# Patient Record
Sex: FEMALE | Race: BLACK OR AFRICAN AMERICAN | NOT HISPANIC OR LATINO | Employment: FULL TIME | URBAN - METROPOLITAN AREA
[De-identification: names, ages, dates, MRNs, and addresses within clinical notes are randomized per-mention and may not be internally consistent; named-entity substitution may affect disease eponyms.]

---

## 2017-01-12 ENCOUNTER — HOSPITAL ENCOUNTER (OUTPATIENT)
Dept: RADIOLOGY | Facility: HOSPITAL | Age: 62
Discharge: HOME | End: 2017-01-12
Attending: ORTHOPAEDIC SURGERY
Payer: COMMERCIAL

## 2017-01-12 ENCOUNTER — OFFICE VISIT (OUTPATIENT)
Dept: ORTHOPEDICS | Facility: CLINIC | Age: 62
End: 2017-01-12
Payer: COMMERCIAL

## 2017-01-12 ENCOUNTER — OFFICE VISIT (OUTPATIENT)
Dept: INTERNAL MEDICINE | Facility: CLINIC | Age: 62
End: 2017-01-12
Payer: COMMERCIAL

## 2017-01-12 ENCOUNTER — OFFICE VISIT (OUTPATIENT)
Dept: OPHTHALMOLOGY | Facility: CLINIC | Age: 62
End: 2017-01-12
Payer: COMMERCIAL

## 2017-01-12 VITALS
BODY MASS INDEX: 38.9 KG/M2 | WEIGHT: 219.56 LBS | HEIGHT: 63 IN | RESPIRATION RATE: 12 BRPM | HEART RATE: 81 BPM | SYSTOLIC BLOOD PRESSURE: 174 MMHG | DIASTOLIC BLOOD PRESSURE: 82 MMHG

## 2017-01-12 DIAGNOSIS — H40.1192 MODERATE STAGE CHRONIC OPEN ANGLE GLAUCOMA: Primary | ICD-10-CM

## 2017-01-12 DIAGNOSIS — M25.561 ARTHRALGIA OF BOTH KNEES: ICD-10-CM

## 2017-01-12 DIAGNOSIS — G89.29 CHRONIC PAIN OF RIGHT KNEE: ICD-10-CM

## 2017-01-12 DIAGNOSIS — E11.9 DIABETES MELLITUS TYPE 2 WITHOUT RETINOPATHY: ICD-10-CM

## 2017-01-12 DIAGNOSIS — G89.29 CHRONIC PAIN OF LEFT KNEE: ICD-10-CM

## 2017-01-12 DIAGNOSIS — M25.562 BILATERAL CHRONIC KNEE PAIN: ICD-10-CM

## 2017-01-12 DIAGNOSIS — H53.8 BLURRY VISION: ICD-10-CM

## 2017-01-12 DIAGNOSIS — M25.561 BILATERAL CHRONIC KNEE PAIN: ICD-10-CM

## 2017-01-12 DIAGNOSIS — H25.013 CATARACT CORTICAL, SENILE, BILATERAL: ICD-10-CM

## 2017-01-12 DIAGNOSIS — M17.12 PRIMARY OSTEOARTHRITIS OF LEFT KNEE: Primary | ICD-10-CM

## 2017-01-12 DIAGNOSIS — I10 ESSENTIAL HYPERTENSION: ICD-10-CM

## 2017-01-12 DIAGNOSIS — M25.532 BILATERAL WRIST PAIN: Primary | ICD-10-CM

## 2017-01-12 DIAGNOSIS — M25.562 CHRONIC PAIN OF LEFT KNEE: ICD-10-CM

## 2017-01-12 DIAGNOSIS — M25.561 BILATERAL CHRONIC KNEE PAIN: Primary | ICD-10-CM

## 2017-01-12 DIAGNOSIS — M25.562 ARTHRALGIA OF BOTH KNEES: ICD-10-CM

## 2017-01-12 DIAGNOSIS — M25.561 CHRONIC PAIN OF RIGHT KNEE: ICD-10-CM

## 2017-01-12 DIAGNOSIS — E11.65 UNCONTROLLED TYPE 2 DIABETES MELLITUS WITH COMPLICATION, UNSPECIFIED LONG TERM INSULIN USE STATUS: ICD-10-CM

## 2017-01-12 DIAGNOSIS — M25.562 BILATERAL CHRONIC KNEE PAIN: Primary | ICD-10-CM

## 2017-01-12 DIAGNOSIS — G89.29 BILATERAL CHRONIC KNEE PAIN: Primary | ICD-10-CM

## 2017-01-12 DIAGNOSIS — E11.8 UNCONTROLLED TYPE 2 DIABETES MELLITUS WITH COMPLICATION, UNSPECIFIED LONG TERM INSULIN USE STATUS: ICD-10-CM

## 2017-01-12 DIAGNOSIS — M25.531 BILATERAL WRIST PAIN: Primary | ICD-10-CM

## 2017-01-12 DIAGNOSIS — F33.1 MODERATE EPISODE OF RECURRENT MAJOR DEPRESSIVE DISORDER: ICD-10-CM

## 2017-01-12 DIAGNOSIS — M17.11 PRIMARY OSTEOARTHRITIS OF RIGHT KNEE: ICD-10-CM

## 2017-01-12 DIAGNOSIS — D50.9 IRON DEFICIENCY ANEMIA, UNSPECIFIED IRON DEFICIENCY ANEMIA TYPE: ICD-10-CM

## 2017-01-12 DIAGNOSIS — E78.5 HYPERLIPIDEMIA, UNSPECIFIED HYPERLIPIDEMIA TYPE: ICD-10-CM

## 2017-01-12 DIAGNOSIS — G89.29 BILATERAL CHRONIC KNEE PAIN: ICD-10-CM

## 2017-01-12 PROCEDURE — 99204 OFFICE O/P NEW MOD 45 MIN: CPT | Mod: 25,S$GLB,, | Performed by: PHYSICIAN ASSISTANT

## 2017-01-12 PROCEDURE — 99999 PR PBB SHADOW E&M-EST. PATIENT-LVL III: CPT | Mod: PBBFAC,,, | Performed by: PHYSICIAN ASSISTANT

## 2017-01-12 PROCEDURE — 3079F DIAST BP 80-89 MM HG: CPT | Mod: S$GLB,,, | Performed by: PHYSICIAN ASSISTANT

## 2017-01-12 PROCEDURE — 3045F PR MOST RECENT HEMOGLOBIN A1C LEVEL 7.0-9.0%: CPT | Mod: S$GLB,,, | Performed by: PHYSICIAN ASSISTANT

## 2017-01-12 PROCEDURE — 92004 COMPRE OPH EXAM NEW PT 1/>: CPT | Mod: S$GLB,,, | Performed by: OPTOMETRIST

## 2017-01-12 PROCEDURE — 1159F MED LIST DOCD IN RCRD: CPT | Mod: S$GLB,,, | Performed by: FAMILY MEDICINE

## 2017-01-12 PROCEDURE — 4010F ACE/ARB THERAPY RXD/TAKEN: CPT | Mod: S$GLB,,, | Performed by: PHYSICIAN ASSISTANT

## 2017-01-12 PROCEDURE — 1159F MED LIST DOCD IN RCRD: CPT | Mod: S$GLB,,, | Performed by: PHYSICIAN ASSISTANT

## 2017-01-12 PROCEDURE — 3077F SYST BP >= 140 MM HG: CPT | Mod: S$GLB,,, | Performed by: PHYSICIAN ASSISTANT

## 2017-01-12 PROCEDURE — 4010F ACE/ARB THERAPY RXD/TAKEN: CPT | Mod: S$GLB,,, | Performed by: FAMILY MEDICINE

## 2017-01-12 PROCEDURE — 73562 X-RAY EXAM OF KNEE 3: CPT | Mod: 26,50,, | Performed by: RADIOLOGY

## 2017-01-12 PROCEDURE — 99999 PR PBB SHADOW E&M-EST. PATIENT-LVL II: CPT | Mod: PBBFAC,,, | Performed by: OPTOMETRIST

## 2017-01-12 PROCEDURE — 99214 OFFICE O/P EST MOD 30 MIN: CPT | Mod: S$GLB,,, | Performed by: FAMILY MEDICINE

## 2017-01-12 PROCEDURE — 3078F DIAST BP <80 MM HG: CPT | Mod: S$GLB,,, | Performed by: FAMILY MEDICINE

## 2017-01-12 PROCEDURE — 99999 PR PBB SHADOW E&M-EST. PATIENT-LVL IV: CPT | Mod: PBBFAC,,, | Performed by: FAMILY MEDICINE

## 2017-01-12 PROCEDURE — 3077F SYST BP >= 140 MM HG: CPT | Mod: S$GLB,,, | Performed by: OPTOMETRIST

## 2017-01-12 PROCEDURE — 20610 DRAIN/INJ JOINT/BURSA W/O US: CPT | Mod: 50,S$GLB,, | Performed by: PHYSICIAN ASSISTANT

## 2017-01-12 PROCEDURE — 3045F PR MOST RECENT HEMOGLOBIN A1C LEVEL 7.0-9.0%: CPT | Mod: S$GLB,,, | Performed by: FAMILY MEDICINE

## 2017-01-12 PROCEDURE — 3075F SYST BP GE 130 - 139MM HG: CPT | Mod: S$GLB,,, | Performed by: FAMILY MEDICINE

## 2017-01-12 PROCEDURE — 3078F DIAST BP <80 MM HG: CPT | Mod: S$GLB,,, | Performed by: OPTOMETRIST

## 2017-01-12 PROCEDURE — 2022F DILAT RTA XM EVC RTNOPTHY: CPT | Mod: S$GLB,,, | Performed by: FAMILY MEDICINE

## 2017-01-12 RX ORDER — GABAPENTIN 300 MG/1
300 CAPSULE ORAL 2 TIMES DAILY
Qty: 60 CAPSULE | Refills: 6 | Status: SHIPPED | OUTPATIENT
Start: 2017-01-12

## 2017-01-12 RX ORDER — METHYLPREDNISOLONE ACETATE 80 MG/ML
80 INJECTION, SUSPENSION INTRA-ARTICULAR; INTRALESIONAL; INTRAMUSCULAR; SOFT TISSUE ONCE
Status: COMPLETED | OUTPATIENT
Start: 2017-01-12 | End: 2017-01-12

## 2017-01-12 RX ORDER — LATANOPROST 50 UG/ML
1 SOLUTION/ DROPS OPHTHALMIC NIGHTLY
Qty: 1 BOTTLE | Refills: 3 | Status: SHIPPED | OUTPATIENT
Start: 2017-01-12 | End: 2018-01-12

## 2017-01-12 RX ORDER — NAPROXEN 375 MG/1
TABLET ORAL
Refills: 0 | COMMUNITY
Start: 2017-01-10 | End: 2017-01-12 | Stop reason: ALTCHOICE

## 2017-01-12 RX ORDER — METHOCARBAMOL 500 MG/1
500 TABLET, FILM COATED ORAL EVERY 6 HOURS PRN
Refills: 0 | COMMUNITY
Start: 2017-01-06 | End: 2017-01-12 | Stop reason: ALTCHOICE

## 2017-01-12 RX ORDER — CARVEDILOL 6.25 MG/1
6.25 TABLET ORAL DAILY
Refills: 5 | COMMUNITY
Start: 2016-10-21

## 2017-01-12 RX ORDER — CYCLOBENZAPRINE HCL 10 MG
10 TABLET ORAL NIGHTLY
Refills: 0 | COMMUNITY
Start: 2017-01-10

## 2017-01-12 RX ORDER — MELOXICAM 7.5 MG/1
7.5 TABLET ORAL 2 TIMES DAILY
Refills: 0 | COMMUNITY
Start: 2017-01-06 | End: 2017-01-12 | Stop reason: SDUPTHER

## 2017-01-12 RX ORDER — ATORVASTATIN CALCIUM 40 MG/1
40 TABLET, FILM COATED ORAL DAILY
Refills: 5 | COMMUNITY
Start: 2016-10-21

## 2017-01-12 RX ORDER — CITALOPRAM 20 MG/1
20 TABLET, FILM COATED ORAL DAILY
Qty: 30 TABLET | Refills: 11 | Status: SHIPPED | OUTPATIENT
Start: 2017-01-12 | End: 2018-01-12

## 2017-01-12 RX ORDER — MELOXICAM 7.5 MG/1
7.5 TABLET ORAL 2 TIMES DAILY
Qty: 60 TABLET | Refills: 6 | Status: SHIPPED | OUTPATIENT
Start: 2017-01-12

## 2017-01-12 RX ADMIN — METHYLPREDNISOLONE ACETATE 80 MG: 80 INJECTION, SUSPENSION INTRA-ARTICULAR; INTRALESIONAL; INTRAMUSCULAR; SOFT TISSUE at 10:01

## 2017-01-12 NOTE — LETTER
January 12, 2017      Kenton Salazar MD  [address] [facility] [place] [address]           [facility] - Ophthalmology  [address]  [place] [address]  Phone: [phone]  Fax: [phone]          Patient: Leah Mercado   MR Number: [id number]   YOB: 1955   Date of Visit: 1/12/2017       Dear Dr. Kenton Salazar:    Thank you for referring Leah Mercado to me for evaluation. Attached you will find relevant portions of my assessment and plan of care.    If you have questions, please do not hesitate to call me. I look forward to following Leah Mercado along with you.    Sincerely,    KVNG Chadwick, OD    Enclosure  CC:  No Recipients    If you would like to receive this communication electronically, please contact [email or web address] or [phone] to request more information on [employer] Link access.    For providers and/or their staff who would like to refer a patient to [facility], please contact us through our one-stop-shop provider referral line, [facility], at [phone].    If you feel you have received this communication in error or would no longer like to receive these types of communications, please e-mail [email or web address]

## 2017-01-12 NOTE — PROGRESS NOTES
HPI     New Patient  Diabetic/IDDM  Patient is being treated for glaucoma (Facundo Gold MD)  Medication: Latanoprost 0.005%  RE  Decreased distance and near point  Patient did not bring glasses to exam    MLC:  She desires to transfer her glaucoma care from [place] to me because   all of her other care is at [facility].  She says she has been compliant with   her COAG drops (latanoprost) but has not seen Dr. Facunod Gold M.D. For   over 6 months.  He IOP is relatively normal today but she will need full   baseline testing in the next 1-2 months.  I will refill her latanoprost Rx   with refills in the interim as she says she just refilled her last bottle.         Last edited by KVNG Chadwick, OD on 1/12/2017  5:06 PM.         Assessment /Plan     For exam results, see Encounter Report.    Moderate stage chronic open angle glaucoma  -     latanoprost 0.005 % ophthalmic solution; Place 1 drop into both eyes every evening.  Dispense: 1 Bottle; Refill: 3      MLC:  She desires to transfer her glaucoma care from [place] to me because all of her other care is at [facility].  She says she has been compliant with her COAG drops (latanoprost) but has not seen Dr. Facundo Gold M.D. For over 6 months.  He IOP is relatively normal today but she will need full baseline testing in the next 1-2 months.  I will refill her latanoprost Rx with refills in the interim as she says she just refilled her last bottle.     No diabetic retinopathy today in either eye.  I will follow over the next year.  She also has moderate NS/cortical cataracts which will need monitoring over the next year.  I will be able to follow the diabetes and cataracts over the next year in the course of monitoring her COAG.    Spec Rx updated.  OH OK OU otherwise.  RTC 3 months for IOP check with full testing in one year.

## 2017-01-12 NOTE — LETTER
January 12, 2017      Kenton Salazar MD  [address] [facility] [place] [address]           [facility] - Orthopedics  [address] [facility] [place] [address]  Phone: [phone]  Fax: [phone]          Patient: Leah Mercado   MR Number: [id number]   YOB: 1955   Date of Visit: 1/12/2017       Dear Dr. Kenton Salazar:    Thank you for referring Leah Mercado to me for evaluation. Attached you will find relevant portions of my assessment and plan of care.    If you have questions, please do not hesitate to call me. I look forward to following Leah Mercado along with you.    Sincerely,    Katie Turk PA-C    Enclosure  CC:  No Recipients    If you would like to receive this communication electronically, please contact [email or web address] or [phone] to request more information on [email or web address] Link access.    For providers and/or their staff who would like to refer a patient to [facility], please contact us through our one-stop-shop provider referral line, [facility], at [phone].    If you feel you have received this communication in error or would no longer like to receive these types of communications, please e-mail [email or web address]

## 2017-01-12 NOTE — MR AVS SNAPSHOT
[facility] Internal Medicine  [address] [facility] [place] [address]  Phone: [phone]  Fax: [phone]                  Leah Mercado   2017 8:40 AM   Office Visit    Description:  Female : 1955   Provider:  Kenton Salazar MD   Department:  [facility] - Internal Medicine           Reason for Visit     Generalized Body Aches     Blurred Vision           Diagnoses this Visit        Comments    Uncontrolled type 2 diabetes mellitus with complication, with long-term current use of insulin    -  Primary     Hyperlipidemia, unspecified hyperlipidemia type         Iron deficiency anemia, unspecified iron deficiency anemia type         Arthralgia of both knees         Essential hypertension         Blurry vision                To Do List           Future Appointments        Provider Department Dept Phone    2017 9:45 AM Katie Turk PA-C [facility] Orthopedics [phone]    2017 11:00 AM KVNG Chadwick OD [facility] Ophthalmology [phone]    2017 12:15 PM LABORATORY, [facility] [phone]    2017 11:00 AM Shaunna Steinberg RD, CDE [facility] Diabetes Management [phone]      Goals (5 Years of Data)     None       These Medications        Disp Refills Start End    citalopram (CELEXA) 20 MG tablet 30 tablet 11 2017    Take 1 tablet (20 mg total) by mouth once daily. - Oral    Pharmacy: [facility] Ph #: [phone]       gabapentin (NEURONTIN) 300 MG capsule 60 capsule 6 2017     Take 1 capsule (300 mg total) by mouth 2 (two) times daily. - Oral    Pharmacy: [facility] [address] - [place] - [address] Ph #: [phone]       meloxicam (MOBIC) 7.5 MG tablet 60 tablet 6 2017     Take 1 tablet (7.5 mg total) by mouth 2 (two) times daily. - Oral    Pharmacy: [facility] - [place], [place] - [address] Ph #: [phone]          [facility]     [facility] Nurse Care Line - 24/7 Assistance  Registered nurses in the [facility] provide clinical advisement, health education, appointment booking, and other advisory services.  Call for this free service at [phone].             Medications           Message regarding Medications     Verify the changes and/or additions to your medication regime listed below are the same as discussed with your clinician today.  If any of these changes or additions are incorrect, please notify your healthcare provider.        START taking these NEW medications        Refills    citalopram (CELEXA) 20 MG tablet 11    Sig: Take 1 tablet (20 mg total) by mouth once daily.    Class: Normal    Route: Oral      CHANGE how you are taking these medications     Start Taking Instead of    gabapentin (NEURONTIN) 300 MG capsule gabapentin (NEURONTIN) 300 MG capsule    Dosage:  Take 1 capsule (300 mg total) by mouth 2 (two) times daily. Dosage:  take 1 capsule by mouth twice a day    Reason for Change:  Reorder     meloxicam (MOBIC) 7.5 MG tablet meloxicam (MOBIC) 7.5 MG tablet    Dosage:  Take 1 tablet (7.5 mg total) by mouth 2 (two) times daily. Dosage:  Take 7.5 mg by mouth 2 (two) times daily.    Reason for Change:  Reorder       STOP taking these medications     methocarbamol (ROBAXIN) 500 MG Tab Take 500 mg by mouth every 6 (six) hours as needed.    methocarbamol (ROBAXIN) 750 MG Tab take 1 tablet by mouth three times a day    naproxen (NAPROSYN) 375 MG tablet take 1 tablet by mouth twice a day with meals for 7 days           Verify that the below list of medications is an accurate representation of the medications you are currently taking.  If none reported, the list may be blank. If incorrect, please contact your healthcare provider. Carry this list with you in case of emergency.           Current Medications     amlodipine (NORVASC) 10 MG tablet take 1 tablet by mouth once daily     "atorvastatin (LIPITOR) 40 MG tablet Take 40 mg by mouth once daily.     calcium carbonate 1250 MG capsule Take 1,250 mg by mouth 2 (two) times daily with meals.    carvedilol (COREG) 6.25 MG tablet Take 6.25 mg by mouth once daily.     cloNIDine (CATAPRES) 0.2 MG tablet take 1 tablet by mouth twice a day    cyclobenzaprine (FLEXERIL) 10 MG tablet Take 10 mg by mouth nightly.    gabapentin (NEURONTIN) 300 MG capsule Take 1 capsule (300 mg total) by mouth 2 (two) times daily.    glimepiride (AMARYL) 4 MG tablet take 1 tablet once daily WITH BREAKFAST    hydrochlorothiazide (HYDRODIURIL) 25 MG tablet Take 1 tablet (25 mg total) by mouth once daily.    LANTUS 100 unit/mL injection inject 45 units UNDER THE SKIN EVERY MORNING AND INJECT 45 UNITS EVERY EVENING    latanoprost 0.005 % ophthalmic solution     lisinopril (PRINIVIL,ZESTRIL) 20 MG tablet take 1 tablet by mouth once daily    meloxicam (MOBIC) 7.5 MG tablet Take 1 tablet (7.5 mg total) by mouth 2 (two) times daily.    metformin (GLUCOPHAGE) 1000 MG tablet Take 1 tablet (1,000 mg total) by mouth 2 (two) times daily with meals.    pravastatin (PRAVACHOL) 40 MG tablet take 1 tablet by mouth once daily    VITAMIN D2 50,000 unit capsule Take 1 capsule by mouth Once a week.    citalopram (CELEXA) 20 MG tablet Take 1 tablet (20 mg total) by mouth once daily.           Clinical Reference Information           Vital Signs - Last Recorded  Most recent update: 1/12/2017  8:50 AM by Nu Dover LPN    BP Pulse Temp Ht Wt BMI    (!) 158/90 (BP Location: Right arm, Patient Position: Sitting, BP Method: Manual) 88 98 °F (36.7 °C) (Tympanic) 5' 3" (1.6 m) 99.6 kg (219 lb 9.3 oz) 38.9 kg/m2      Blood Pressure          Most Recent Value    BP  (!)  158/90      Allergies as of 1/12/2017     No Known Allergies      Immunizations Administered on Date of Encounter - 1/12/2017     None      Orders Placed During Today's Visit      Normal Orders This Visit    Ambulatory Referral " to Diabetes Education     Ambulatory referral to Optometry     Ambulatory referral to Orthopedics     Future Labs/Procedures Expected by Expires    CBC auto differential  1/12/2017 3/13/2018    Comprehensive metabolic panel  1/12/2017 3/13/2018    Ferritin  1/12/2017 3/13/2018    Hemoglobin A1c  1/12/2017 3/13/2018    Iron and TIBC  1/12/2017 3/13/2018    Lipid panel  1/12/2017 1/12/2018      [facility] Sign-Up     Activating your [facility] account is as easy as 1-2-3!     1) Visit [email or web address], select Sign Up Now, enter this activation code and your date of birth, then select Next.  [email or web address]  Expires: 2/26/2017  9:22 AM      2) Create a username and password to use when you visit [facility] in the future and select a security question in case you lose your password and select Next.    3) Enter your e-mail address and click Sign Up!    Additional Information  If you have questions, please e-mail [email or web address] or call [phone] to talk to our [facility] staff. Remember, [facility] is NOT to be used for urgent needs. For medical emergencies, dial 911.

## 2017-01-12 NOTE — PROGRESS NOTES
Subjective:      Patient ID: Leah Mercado is a 61 y.o. female.    Chief Complaint: Pain of the Right Knee and Pain of the Left Knee      HPI: Leah Mercado  is a 61 y.o. female who c/o Pain of the Right Knee and Pain of the Left Knee   for duration of several years.  She denies an inciting injury.  Her pain level is 10 out of 10 in severity.  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  SHe is unable to do full shift secondary to her pain.  She has tried over-the-counter Tylenol as well as over-the-counter anti-inflammatories in the past without relief.  She has seen Dr. Timbo Tomlinson previously.  He has given her injections in the past which only alleviated her symptoms for approximately 3 months.  She has not seen him in years per her report.  Quality is aching, sharp, burning, and intermittent.  Alleviating factors include ice as well as steroid injections.  Aggravating factors include prolonged weightbearing as well as prolonged inactivity.  She describes start up pain.  She complains of associated swelling on occasion as well.  The left knee is worse than the right knee.    Review of Systems   Constitution: Negative for fever.   HENT: Negative for headaches.    Cardiovascular: Negative for chest pain.   Respiratory: Negative for cough and shortness of breath.    Skin: Negative for rash.   Musculoskeletal: Positive for joint pain, joint swelling and stiffness.   Gastrointestinal: Negative for heartburn.   Neurological: Negative for numbness.         Objective:        General    Nursing note and vitals reviewed.  Constitutional: She is oriented to person, place, and time. She appears well-developed and well-nourished.   HENT:   Head: Normocephalic and atraumatic.   Eyes: EOM are normal.   Cardiovascular: Normal rate and regular rhythm.    Pulmonary/Chest: Effort normal.   Abdominal: Soft.   Neurological: She is alert and oriented to person, place, and time.   Psychiatric: She has a normal mood  and affect. Her behavior is normal.     General Musculoskeletal Exam   Gait: normal       Right Knee Exam   Right knee exam is normal.    Inspection   Erythema: absent  Swelling: absent  Effusion: effusion  Deformity: deformity  Bruising: absent    Tenderness   The patient is tender to palpation of the condyle and medial joint line.    Crepitus   The patient has crepitus of the patella.    Range of Motion   Extension: normal Right knee extension grade: Pain with full extension.   Flexion: normal     Tests   Meniscus   Jocelynn:  Medial - negative Lateral - negative  Ligament Examination Lachman: normal (-1 to 2mm) PCL-Posterior Drawer: normal (0 to 2mm)     MCL - Valgus: normal (0 to 2mm)  LCL - Varus: normal  Patella   Patellar Apprehension: negative  Patellar Tracking: normal  Patellar Grind: positive    Other   Meniscal Cyst: absent  Popliteal (Baker's) Cyst: absent  Sensation: normal    Comments:  Comp soft, cap refill < 2 sec.    Left Knee Exam     Inspection   Erythema: absent  Swelling: absent  Effusion: absent  Deformity: deformity  Bruising: absent    Tenderness   The patient tender to palpation of the condyle and medial joint line.    Crepitus   The patient has crepitus of the patella.    Range of Motion   Extension: normal Left knee extension grade: pain full extension.   Flexion: normal     Tests   Meniscus   Jocelynn:  Medial - negative Lateral - negative  Stability Lachman: normal (-1 to 2mm) PCL-Posterior Drawer: normal (0 to 2mm)  MCL - Valgus: normal (0 to 2mm)  LCL - Varus: normal (0 to 2mm)  Patella   Patellar Apprehension: negative  Patellar Tracking: normal  Patellar Grind: positive    Other   Meniscal Cyst: absent  Popliteal (Baker's) Cyst: absent  Sensation: normal    Comments:  Comp soft, cap refill < 2 sec.    Muscle Strength   Right Lower Extremity   Quadriceps:  5/5   Hamstrin/5   Left Lower Extremity   Quadriceps:  4/5   Hamstrin/5     Vascular Exam       Edema  Right Lower Leg:  absent  Left Lower Leg: absent            Xray:   Bilateral knees from today images and report were reviewed today.  I agree with the radiologist's interpretation.  Compared to December 14, 2011. Mild progression of tricompartment degenerative change in the medial tibiofemoral joint spaces bilaterally. No joint effusion or acute osseous findings.    Labs  Hgb A1c - 8.2 in 4/2016 which indicates poorly controlled DM.      Assessment:       Encounter Diagnoses   Name Primary?    Primary osteoarthritis of left knee Yes    Primary osteoarthritis of right knee     Chronic pain of right knee     Chronic pain of left knee     Uncontrolled type 2 diabetes mellitus with complication, unspecified long term insulin use status           Plan:       Leah was seen today for pain and pain.    Diagnoses and all orders for this visit:    Primary osteoarthritis of left knee  -     methylPREDNISolone acetate injection 80 mg; Inject 1 mL (80 mg total) into the articular space once.  -     hylan g-f 20 48 mg/6 mL injection 48 mg; Inject 48 mg into the articular space one time.  -     Prior Authorization Order    Primary osteoarthritis of right knee  -     methylPREDNISolone acetate injection 80 mg; Inject 1 mL (80 mg total) into the articular space once.  -     hylan g-f 20 48 mg/6 mL injection 48 mg; Inject 48 mg into the articular space one time.  -     Prior Authorization Order    Chronic pain of right knee  -     methylPREDNISolone acetate injection 80 mg; Inject 1 mL (80 mg total) into the articular space once.  -     hylan g-f 20 48 mg/6 mL injection 48 mg; Inject 48 mg into the articular space one time.  -     Prior Authorization Order    Chronic pain of left knee  -     methylPREDNISolone acetate injection 80 mg; Inject 1 mL (80 mg total) into the articular space once.  -     hylan g-f 20 48 mg/6 mL injection 48 mg; Inject 48 mg into the articular space one time.  -     Prior Authorization Order    Uncontrolled type 2  diabetes mellitus with complication, unspecified long term insulin use status    Ms. Lynn comes in today to be evaluated for the bilateral knees.  These are new problems to me.  She has significant osteoarthritic changes in the bilateral knees.  We discussed conservative treatment options for osteoarthritis of the knees including; steroid injections, hyaluronic acid injections, braces, physical therapy for strengthening, activity modification, as well as anti-inflammatory medication.  I do recommend getting her an neoprene hinged knee brace for the left knee.  I would also like to get her some physical therapy exercises for the bilateral knees.  She has some weakness on the left side and I think she would benefit from getting her muscles stronger.  She works at  [employer].  She would like to talk to the physical therapist about some exercises rather than doing formal therapy.  Dr. Salazar put her on meloxicam prescription this morning.  She is on 7.5 mg twice a day.  I recommend she start taking those today.  She should not take any other oral anti-inflammatories over-the-counter while on this.  We also discussed risks and benefits of a steroid injection.  She is diabetic.  Last A1c showed that it was uncontrolled.  But this was back in April.  Steroid injections may cause her blood sugars to go up, and can affect the blood work by Dr. Salazar has ordered for her today.  She understands this.  If they go up and she has trouble getting them under control or do not return to baseline, recommend she follow up with Dr. Salazar.  I will also submit an order for hyaluronic acid injection.  We discussed risks and benefits of proceeding with Synvisc 1 injection in the bilateral knees.  She would like me to go ahead and get that set up for her as well.  I will plan to see her back in 1 month for the Synvisc 1 injections in bilateral knees.  She has problems before then, she should notify the office.   Patient verbalizes understanding and is in agreement with the above.    Return in about 4 weeks (around 2/9/2017) for for synvisc one bilateral knees.      The patient understands, chooses and consents to this plan and accepts all   the risks which include but are not limited to the risks mentioned above.     Left Knee Injection Report:  After verbal consent was obtained for left knee injection, patient ID, site, and side were verified.  The  left  knee was sterilly prepped in the standard fashion.  A 22-gauge needle was introduced into left knee joint from an sina-lateral site without complication. The left knee was then injected with 20 mg lidocaine plain and 80 mg depomedrol.  A sterile bandaid was applied.  The patient was informed to apply an ice pack approximately 10min once arriving home and not to do anything strenuous for 24hours.  She was instructed to call if there were any problems. The patient was discharged in stable condition.    Right Knee Injection Report:  After verbal consent was obtained for right knee injection, patient ID, site, and side were verified.  The  right  knee was sterilly prepped in the standard fashion.  A 22-gauge needle was introduced into right knee joint from an sina-lateral site without complication. The right knee was then injected with 20 mg lidocaine plain and 80 mg depomedrol.  A sterile bandaid was applied.  The patient was informed to apply an ice pack approximately 10min once arriving home and not to do anything strenuous for 24hours.  She was instructed to call if there were any problems. The patient was discharged in stable condition.    Disclaimer: This note was prepared using a voice recognition system and is likely to have sound alike errors within the text.

## 2017-01-12 NOTE — MR AVS SNAPSHOT
[facility]  [address]  [place] [address]  Phone: [phone]  Fax: [phone]                  Leah Mercado   2017 11:00 AM   Office Visit    Description:  Female : 1955   Provider:  KVNG Chadwick, OD   Department:  [facility]           Reason for Visit     Diabetic Eye Exam     Glaucoma           Diagnoses this Visit        Comments    Moderate stage chronic open angle glaucoma    -  Primary     Diabetes mellitus type 2 without retinopathy         Cataract cortical, senile, bilateral         Cataract, nuclear, bilateral                To Do List           Future Appointments        Provider Department Dept Phone    2017 8:00 AM [facility] XR2 [facility] [phone]    2017 8:15 AM Katie Turk PA-C [facility] [phone]    2017 7:30 AM FIELDS, VISUAL-ONE [facility] [phone]    2017 8:00 AM KVNG Chadwick, FLOR [facility] [phone]    2017 8:00 AM Katie Turk PA-C [facility] [phone]      Goals (5 Years of Data)     None      Follow-Up and Disposition     Return in about 3 months (around 2017).       These Medications        Disp Refills Start End    latanoprost 0.005 % ophthalmic solution 1 Bottle 3 2017    Place 1 drop into both eyes every evening. - Both Eyes    Pharmacy: [id number]  [place]  #: [phone]         [facility]     [facility] -  Assistance  Registered nurses in the [facility] provide clinical advisement, health education, appointment booking, and other advisory services.  Call for this free service at [phone].             Medications           Message regarding Medications     Verify the changes and/or additions to your medication regime listed below are the same as discussed with your clinician today.  If any of  these changes or additions are incorrect, please notify your healthcare provider.        CHANGE how you are taking these medications     Start Taking Instead of    latanoprost 0.005 % ophthalmic solution latanoprost 0.005 % ophthalmic solution    Dosage:  Place 1 drop into both eyes every evening.     Reason for Change:  Reorder            Verify that the below list of medications is an accurate representation of the medications you are currently taking.  If none reported, the list may be blank. If incorrect, please contact your healthcare provider. Carry this list with you in case of emergency.           Current Medications     amlodipine (NORVASC) 10 MG tablet take 1 tablet by mouth once daily    atorvastatin (LIPITOR) 40 MG tablet Take 40 mg by mouth once daily.     calcium carbonate 1250 MG capsule Take 1,250 mg by mouth 2 (two) times daily with meals.    carvedilol (COREG) 6.25 MG tablet Take 6.25 mg by mouth once daily.     cloNIDine (CATAPRES) 0.2 MG tablet take 1 tablet by mouth twice a day    cyclobenzaprine (FLEXERIL) 10 MG tablet Take 10 mg by mouth nightly.    gabapentin (NEURONTIN) 300 MG capsule Take 1 capsule (300 mg total) by mouth 2 (two) times daily.    glimepiride (AMARYL) 4 MG tablet take 1 tablet once daily WITH BREAKFAST    hydrochlorothiazide (HYDRODIURIL) 25 MG tablet Take 1 tablet (25 mg total) by mouth once daily.    LANTUS 100 unit/mL injection inject 45 units UNDER THE SKIN EVERY MORNING AND INJECT 45 UNITS EVERY EVENING    lisinopril (PRINIVIL,ZESTRIL) 20 MG tablet take 1 tablet by mouth once daily    meloxicam (MOBIC) 7.5 MG tablet Take 1 tablet (7.5 mg total) by mouth 2 (two) times daily.    metformin (GLUCOPHAGE) 1000 MG tablet Take 1 tablet (1,000 mg total) by mouth 2 (two) times daily with meals.    pravastatin (PRAVACHOL) 40 MG tablet take 1 tablet by mouth once daily    VITAMIN D2 50,000 unit capsule Take 1 capsule by mouth Once a week.    citalopram (CELEXA) 20 MG tablet Take 1  tablet (20 mg total) by mouth once daily.    latanoprost 0.005 % ophthalmic solution Place 1 drop into both eyes every evening.           Clinical Reference Information           Allergies as of 1/12/2017     No Known Allergies      Immunizations Administered on Date of Encounter - 1/12/2017     None      [facility] Sign-Up     Activating your [facility] account is as easy as 1-2-3!     1) Visit [email or web address], select Sign Up Now, enter this activation code and your date of birth, then select Next.  [email or web address]  Expires: 2/26/2017  9:22 AM      2) Create a username and password to use when you visit [facility] in the future and select a security question in case you lose your password and select Next.    3) Enter your e-mail address and click Sign Up!    Additional Information  If you have questions, please e-mail [email or web address] or call [phone] to talk to our [facility] staff. Remember, [facility] is NOT to be used for urgent needs. For medical emergencies, dial 911.

## 2017-01-12 NOTE — PROGRESS NOTES
Subjective:       Patient ID: Leah Mercado is a 61 y.o. female.    Chief Complaint: Generalized Body Aches and Blurred Vision    HPI Comments: General aches:      Pt has multiple issues with medical problems and DM, Hyperlipidemia, HTN and anemia. Pt has arthritis particularly in her knees per xray in 2016. Lengthy discussion with pt that many of her problems are interconnected stemming from uncontrolled DM, elevated Blood pressure, fatigue. Pt also reports sx consistent with depression to include longer 3 weeks with energy loss, anhedonia, poor concentration and sleep problems    Review of Systems   Constitutional: Negative.    Respiratory: Negative.    Cardiovascular: Negative.    Genitourinary: Negative.    Neurological: Negative.        Objective:      Physical Exam   Constitutional: She is oriented to person, place, and time. She appears well-developed and well-nourished.   Cardiovascular: Normal rate and regular rhythm.    Pulmonary/Chest: Effort normal and breath sounds normal.   Musculoskeletal:        Left knee: She exhibits decreased range of motion and erythema.   Neurological: She is alert and oriented to person, place, and time.   Skin: Skin is warm.       Assessment:       1. Uncontrolled type 2 diabetes mellitus with complication, with long-term current use of insulin    2. Hyperlipidemia, unspecified hyperlipidemia type    3. Iron deficiency anemia, unspecified iron deficiency anemia type    4. Arthralgia of both knees    5. Essential hypertension    6. Blurry vision    7. Uncontrolled type 2 diabetes with neuropathy    8. Moderate episode of recurrent major depressive disorder        Plan:       Uncontrolled type 2 diabetes mellitus with complication, with long-term current use of insulin  -     Ambulatory Referral to Diabetes Education  -     Hemoglobin A1c; Future; Expected date: 1/12/17    Hyperlipidemia, unspecified hyperlipidemia type  -     Lipid panel; Future; Expected date: 1/12/17    Iron  deficiency anemia, unspecified iron deficiency anemia type  -     Ferritin; Future; Expected date: 1/12/17  -     Iron and TIBC; Future; Expected date: 1/12/17    Arthralgia of both knees  -     Ambulatory referral to Orthopedics    Essential hypertension  -     CBC auto differential; Future; Expected date: 1/12/17  -     Comprehensive metabolic panel; Future; Expected date: 1/12/17    Blurry vision  -     Ambulatory referral to Optometry    Uncontrolled type 2 diabetes with neuropathy  Comments:  Will continue with the gabapentin    Moderate episode of recurrent major depressive disorder  Comments:  Will start on Celexa 20 mg    Other orders  -     citalopram (CELEXA) 20 MG tablet; Take 1 tablet (20 mg total) by mouth once daily.  Dispense: 30 tablet; Refill: 11  -     gabapentin (NEURONTIN) 300 MG capsule; Take 1 capsule (300 mg total) by mouth 2 (two) times daily.  Dispense: 60 capsule; Refill: 6  -     meloxicam (MOBIC) 7.5 MG tablet; Take 1 tablet (7.5 mg total) by mouth 2 (two) times daily.  Dispense: 60 tablet; Refill: 6

## 2017-01-13 ENCOUNTER — TELEPHONE (OUTPATIENT)
Dept: INTERNAL MEDICINE | Facility: CLINIC | Age: 62
End: 2017-01-13

## 2017-01-13 NOTE — TELEPHONE ENCOUNTER
----- Message from Noemi Jha sent at 1/13/2017 10:45 AM CST -----  Contact: [phone]  Pt states she is still in a lot of pain and wants to know what she can do for the pain. Pt can be reached at [phone] ([email or web address])

## 2017-01-13 NOTE — TELEPHONE ENCOUNTER
Patient called stating that she is in a lot of pain. Stating that the pain is all over. 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 She stated that she is taking all medications that was prescribed but nothing helping yet. She stated that her job is threatening to terminate her. Patient is asking if you would be able to fill out FMLA paperwork for her to take sometime off work due to her pain.

## 2017-01-15 VITALS
HEIGHT: 63 IN | WEIGHT: 219.56 LBS | TEMPERATURE: 98 F | DIASTOLIC BLOOD PRESSURE: 90 MMHG | BODY MASS INDEX: 38.9 KG/M2 | HEART RATE: 88 BPM | SYSTOLIC BLOOD PRESSURE: 138 MMHG

## 2017-01-15 DIAGNOSIS — M50.30 DDD (DEGENERATIVE DISC DISEASE), CERVICAL: Primary | ICD-10-CM

## 2017-01-15 DIAGNOSIS — M51.36 DDD (DEGENERATIVE DISC DISEASE), LUMBAR: ICD-10-CM

## 2017-01-17 ENCOUNTER — HOSPITAL ENCOUNTER (OUTPATIENT)
Dept: RADIOLOGY | Facility: HOSPITAL | Age: 62
Discharge: HOME | End: 2017-01-17
Attending: ORTHOPAEDIC SURGERY
Payer: COMMERCIAL

## 2017-01-17 ENCOUNTER — OFFICE VISIT (OUTPATIENT)
Dept: ORTHOPEDICS | Facility: CLINIC | Age: 62
End: 2017-01-17
Payer: COMMERCIAL

## 2017-01-17 VITALS
DIASTOLIC BLOOD PRESSURE: 94 MMHG | HEART RATE: 71 BPM | SYSTOLIC BLOOD PRESSURE: 163 MMHG | HEIGHT: 63 IN | WEIGHT: 217.81 LBS | BODY MASS INDEX: 38.59 KG/M2

## 2017-01-17 DIAGNOSIS — M75.21 BICEPS TENDINITIS, RIGHT: ICD-10-CM

## 2017-01-17 DIAGNOSIS — M25.532 BILATERAL WRIST PAIN: ICD-10-CM

## 2017-01-17 DIAGNOSIS — M50.30 DDD (DEGENERATIVE DISC DISEASE), CERVICAL: ICD-10-CM

## 2017-01-17 DIAGNOSIS — M19.011 DJD OF AC (ACROMIOCLAVICULAR) JOINTS, BILATERAL: ICD-10-CM

## 2017-01-17 DIAGNOSIS — M25.511 BILATERAL SHOULDER PAIN, UNSPECIFIED CHRONICITY: Primary | ICD-10-CM

## 2017-01-17 DIAGNOSIS — M25.512 ACUTE PAIN OF BOTH SHOULDERS: Primary | ICD-10-CM

## 2017-01-17 DIAGNOSIS — M25.531 BILATERAL WRIST PAIN: ICD-10-CM

## 2017-01-17 DIAGNOSIS — M17.12 PRIMARY OSTEOARTHRITIS OF LEFT KNEE: ICD-10-CM

## 2017-01-17 DIAGNOSIS — M25.511 BILATERAL SHOULDER PAIN, UNSPECIFIED CHRONICITY: ICD-10-CM

## 2017-01-17 DIAGNOSIS — M19.012 DJD OF AC (ACROMIOCLAVICULAR) JOINTS, BILATERAL: ICD-10-CM

## 2017-01-17 DIAGNOSIS — M25.512 BILATERAL SHOULDER PAIN, UNSPECIFIED CHRONICITY: Primary | ICD-10-CM

## 2017-01-17 DIAGNOSIS — M25.511 ACUTE PAIN OF BOTH SHOULDERS: Primary | ICD-10-CM

## 2017-01-17 DIAGNOSIS — M17.11 PRIMARY OSTEOARTHRITIS OF RIGHT KNEE: ICD-10-CM

## 2017-01-17 DIAGNOSIS — M25.512 BILATERAL SHOULDER PAIN, UNSPECIFIED CHRONICITY: ICD-10-CM

## 2017-01-17 DIAGNOSIS — M75.82 ROTATOR CUFF TENDINITIS, LEFT: ICD-10-CM

## 2017-01-17 PROCEDURE — 4010F ACE/ARB THERAPY RXD/TAKEN: CPT | Mod: S$GLB,,, | Performed by: PHYSICIAN ASSISTANT

## 2017-01-17 PROCEDURE — 73030 X-RAY EXAM OF SHOULDER: CPT | Mod: 26,50,, | Performed by: RADIOLOGY

## 2017-01-17 PROCEDURE — 99999 PR PBB SHADOW E&M-EST. PATIENT-LVL V: CPT | Mod: PBBFAC,,, | Performed by: PHYSICIAN ASSISTANT

## 2017-01-17 PROCEDURE — 73110 X-RAY EXAM OF WRIST: CPT | Mod: 26,50,, | Performed by: RADIOLOGY

## 2017-01-17 PROCEDURE — 3080F DIAST BP >= 90 MM HG: CPT | Mod: S$GLB,,, | Performed by: PHYSICIAN ASSISTANT

## 2017-01-17 PROCEDURE — 3077F SYST BP >= 140 MM HG: CPT | Mod: S$GLB,,, | Performed by: PHYSICIAN ASSISTANT

## 2017-01-17 PROCEDURE — 99214 OFFICE O/P EST MOD 30 MIN: CPT | Mod: S$GLB,,, | Performed by: PHYSICIAN ASSISTANT

## 2017-01-17 PROCEDURE — 1159F MED LIST DOCD IN RCRD: CPT | Mod: S$GLB,,, | Performed by: PHYSICIAN ASSISTANT

## 2017-01-17 PROCEDURE — 3045F PR MOST RECENT HEMOGLOBIN A1C LEVEL 7.0-9.0%: CPT | Mod: S$GLB,,, | Performed by: PHYSICIAN ASSISTANT

## 2017-01-17 NOTE — MR AVS SNAPSHOT
[facility]  [address] [place] [address]  Phone: [phone]  Fax: [phone]                  Leah Mercado   2017 8:30 AM   Office Visit    Description:  Female : 1955   Provider:  Katie Turk PA-C   Department:  [facility]           Reason for Visit     Right Shoulder - Pain     Left Shoulder - Pain           Diagnoses this Visit        Comments    Acute pain of both shoulders    -  Primary     DJD of AC (acromioclavicular) joints, bilateral         Biceps tendinitis, right         Rotator cuff tendinitis, left         DDD (degenerative disc disease), cervical         Primary osteoarthritis of right knee         Primary osteoarthritis of left knee                To Do List           Future Appointments        Provider Department Dept Phone    2017 8:00 AM [facility] MRI [facility] [phone]    2017 8:00 AM Thom Agee MD [facility] [phone]    2017 7:30 AM FIELDS, VISUAL-ONE [facility] [phone]    2017 8:00 AM [facility] Chad[facility] [phone]    2017 8:00 AM Katie Turk PA-C [facility] [phone]      Goals (5 Years of Data)     None      [facility] -  Assistance  Registered nurses in the [facility] provide clinical advisement, health education, appointment booking, and other advisory services.  Call for this free service at [phone].             Medications           Message regarding Medications     Verify the changes and/or additions to your medication regime listed below are the same as discussed with your clinician today.  If any of these changes or additions are incorrect, please notify your healthcare provider.             Verify that the below list of medications is an accurate representation of the medications you are currently taking.  If none reported, the  "list may be blank. If incorrect, please contact your healthcare provider. Carry this list with you in case of emergency.           Current Medications     amlodipine (NORVASC) 10 MG tablet take 1 tablet by mouth once daily    atorvastatin (LIPITOR) 40 MG tablet Take 40 mg by mouth once daily.     calcium carbonate 1250 MG capsule Take 1,250 mg by mouth 2 (two) times daily with meals.    carvedilol (COREG) 6.25 MG tablet Take 6.25 mg by mouth once daily.     citalopram (CELEXA) 20 MG tablet Take 1 tablet (20 mg total) by mouth once daily.    cloNIDine (CATAPRES) 0.2 MG tablet take 1 tablet by mouth twice a day    cyclobenzaprine (FLEXERIL) 10 MG tablet Take 10 mg by mouth nightly.    gabapentin (NEURONTIN) 300 MG capsule Take 1 capsule (300 mg total) by mouth 2 (two) times daily.    glimepiride (AMARYL) 4 MG tablet take 1 tablet once daily WITH BREAKFAST    hydrochlorothiazide (HYDRODIURIL) 25 MG tablet Take 1 tablet (25 mg total) by mouth once daily.    LANTUS 100 unit/mL injection inject 45 units UNDER THE SKIN EVERY MORNING AND INJECT 45 UNITS EVERY EVENING    latanoprost 0.005 % ophthalmic solution Place 1 drop into both eyes every evening.    lisinopril (PRINIVIL,ZESTRIL) 20 MG tablet take 1 tablet by mouth once daily    meloxicam (MOBIC) 7.5 MG tablet Take 1 tablet (7.5 mg total) by mouth 2 (two) times daily.    metformin (GLUCOPHAGE) 1000 MG tablet Take 1 tablet (1,000 mg total) by mouth 2 (two) times daily with meals.    pravastatin (PRAVACHOL) 40 MG tablet take 1 tablet by mouth once daily    VITAMIN D2 50,000 unit capsule Take 1 capsule by mouth Once a week.           Clinical Reference Information           Vital Signs - Last Recorded  Most recent update: 1/17/2017  8:17 AM by Ynes Melo    BP Pulse Ht Wt BMI    (!) 163/94 (BP Location: Right arm, Patient Position: Sitting, BP Method: Automatic) 71 5' 3" (1.6 m) 98.8 kg (217 lb 13 oz) 38.58 kg/m2      Blood Pressure          Most Recent Value    BP  " (!)  163/94      Allergies as of 1/17/2017     No Known Allergies      Immunizations Administered on Date of Encounter - 1/17/2017     None      Orders Placed During Today's Visit      Normal Orders This Visit    Ambulatory Referral to Physical/Occupational Therapy     Future Labs/Procedures Expected by Expires    MRI Cervical Spine Without Contrast  1/17/2017 1/17/2018      [facility] Sign-Up     Activating your [facility] account is as easy as 1-2-3!     1) Visit [email or web address], select Sign Up Now, enter this activation code and your date of birth, then select Next.  [email or web address]  Expires: 2/26/2017  9:22 AM      2) Create a username and password to use when you visit [facility] in the future and select a security question in case you lose your password and select Next.    3) Enter your e-mail address and click Sign Up!    Additional Information  If you have questions, please e-mail [email or web address] or call [phone] to talk to our [facility] staff. Remember, [facility] is NOT to be used for urgent needs. For medical emergencies, dial 911.

## 2017-01-17 NOTE — PROGRESS NOTES
Subjective:      Patient ID: Leah Mercado is a 61 y.o. female.    Chief Complaint: Pain of the Right Shoulder and Pain of the Left Shoulder      HPI: Leah Mercado  is a 61 y.o. female who c/o Pain of the Right Shoulder and Pain of the Left Shoulder   for duration of about 2-3 months.  She denies an inciting injury.  She states the pain starts up in the neck area and radiates to the bilateral shoulders and down the bilateral upper extremities and hands.  She does complain of intermittent numbness and tingling in the bilateral hands as well.  A level is about a 9 out of 10 in severity.  Quality is sharp, and burning.  It is intermittent.  Alleviating factors include nothing.  Aggravating factors include overhead movement.    Review of Systems   Constitution: Negative for fever.   HENT: Negative for headaches.    Cardiovascular: Negative for chest pain.   Respiratory: Negative for cough and shortness of breath.    Skin: Negative for rash.   Musculoskeletal: Positive for arthritis, joint pain and stiffness. Negative for joint swelling.   Gastrointestinal: Negative for heartburn.   Neurological: Positive for numbness (bilateral hands).         Objective:        General    Nursing note and vitals reviewed.  Constitutional: She is oriented to person, place, and time. She appears well-developed and well-nourished.   HENT:   Head: Normocephalic and atraumatic.   Eyes: EOM are normal.   Cardiovascular: Normal rate and regular rhythm.    Pulmonary/Chest: Effort normal and breath sounds normal.   Abdominal: Soft.   Neurological: She is alert and oriented to person, place, and time.   Psychiatric: She has a normal mood and affect. Her behavior is normal.         Back (L-Spine & T-Spine) / Neck (C-Spine) Exam     Tenderness   The patient is tender to palpation of the right trapezial and left trapezial. Right paramedian tenderness of the Lower C-Spine. Left paramedian tenderness of the Lower C-Spine.     Neck (C-Spine) Range of  Motion   Flexion:     Normal  Extension: Normal  Right Lateral Bend: normal  Left Lateral Bend: normal  Right Rotation: normal  Left Rotation: normal    Neck (C-Spine) Tests   Spurling's Test   Right: positive  Right Shoulder Exam     Inspection/Observation   Swelling: absent  Bruising: absent  Scars: absent  Deformity: absent    Tenderness   The patient is tender to palpation of the greater tuberosity and biceps tendon.    Range of Motion   Active Abduction: normal   Passive Abduction: normal   Extension: normal   Forward Flexion: normal   Forward Elevation: normal  Adduction: normal  External Rotation 0 degrees: normal   Internal Rotation 0 degrees: normal     Tests & Signs   Cross Arm: negative  Drop Arm: negative  Hawkin's test: negative  Impingement: negative    Other   Sensation: normal    Comments:  She exhibits full range of motion but she does have pain at the extremes of motion.  Additionally, she has well maintained rotator cuff strength, but she does have pain with abduction strength testing as well as external rotation strength testing.    Left Shoulder Exam     Inspection/Observation   Swelling: absent  Bruising: absent  Scars: absent  Deformity: absent    Tenderness   The patient is tender to palpation of the supraspinatus.    Range of Motion   Active Abduction: normal   Passive Abduction: normal   Extension: normal   Forward Flexion: normal   Forward Elevation: normal  Adduction: normal  External Rotation 0 degrees: normal   Internal Rotation 0 degrees: normal     Tests & Signs   Cross Arm: negative  Drop Arm: negative  Hawkin's test: positive  Impingement: positive    Other   Sensation: normal     Comments:  She exhibits full range of motion but she does have pain at the extremes of motion.  Additionally, she has well maintained rotator cuff strength, but she does have pain with abduction strength testing as well as external rotation strength testing.      Muscle Strength   Right Upper Extremity    Shoulder Abduction: 5/5   Shoulder Internal Rotation: 5/5   Shoulder External Rotation: 5/5   Biceps: 5/5/5   Triceps:  5/5  Wrist Extension: 5/5/5   Wrist Flexion: 5/5/5   Finger Flexors:  3/5  Left Upper Extremity  Shoulder Abduction: 5/5   Shoulder Internal Rotation: 5/5   Shoulder External Rotation: 5/5   Biceps: 5/5/5   Deltoid:  5/5  Triceps:  5/5  Wrist Extension: 5/5/5   Wrist Flexion: 5/5/5   Finger Flexors:  3/5    Reflexes     Left Side  Biceps:  2+  Brachioradialis:  2+    Right Side   Biceps:  2+  Brachioradialis:  2+    Vascular Exam     Right Pulses      Radial:                    2+      Left Pulses      Radial:                    2+      Capillary Refill  Right Hand: normal capillary refill  Left Hand: normal capillary refill            Xray:   Bilateral shoulders from today images and report were reviewed today.  I agree with the radiologist's interpretation.  There is degenerative change of the acromioclavicular joints and to a lesser degree the glenohumeral joints bilaterally.  Spurring of the humeral greater tuberosities on both sides.  No fracture or dislocation.    Neck from 4/2016 images and report were also reviewed today.  I agree with the radiologist's interpretation.  Vertebral alignment is normal.  There is multilevel degenerative disc disease with loss of disc height marginal anterior osteophyte formation throughout the cervical spine most pronounced at C5-6.  No compression fracture acute osseous abnormality is identified.    Assessment:       Encounter Diagnoses   Name Primary?    Acute pain of both shoulders Yes    DJD of AC (acromioclavicular) joints, bilateral     Biceps tendinitis, right     Rotator cuff tendinitis, left     DDD (degenerative disc disease), cervical     Primary osteoarthritis of right knee     Primary osteoarthritis of left knee     Uncontrolled type 2 diabetes with neuropathy           Plan:       Leah was seen today for pain and pain.    Diagnoses and  all orders for this visit:    Acute pain of both shoulders  -     Ambulatory Referral to Physical/Occupational Therapy    DJD of AC (acromioclavicular) joints, bilateral  -     Ambulatory Referral to Physical/Occupational Therapy    Biceps tendinitis, right  -     Ambulatory Referral to Physical/Occupational Therapy    Rotator cuff tendinitis, left  -     Ambulatory Referral to Physical/Occupational Therapy    DDD (degenerative disc disease), cervical  -     MRI Cervical Spine Without Contrast; Future    Primary osteoarthritis of right knee  -     Ambulatory Referral to Physical/Occupational Therapy    Primary osteoarthritis of left knee  -     Ambulatory Referral to Physical/Occupational Therapy    Uncontrolled type 2 diabetes with neuropathy    Ms. Lynn is an established patient comes in today with new problems in the bilateral upper extremities.  I reviewed her previous x-rays done back in April of last year.  She does have significant degenerative disc disease of the cervical spine with anterior osteophytes most notably at C5-6 level.  I do think that some of the bilateral arm pain and numbness and tingling could be coming from her neck.  I will order an MRI of the cervical spine with referral to Dr. Agee after the MRI.  I do think at least some of the pain is coming from the shoulders.  She does have some impingement symptoms on the left side and some tendinitis symptoms on the right side.  I like to get her going with some formal physical therapy for range of motion, strengthening, as well as manual modalities for pain.  I would hold off on a steroid injection of the left shoulder today because a neighbor's to steroid shots last week.  She got one in each knee.  Her blood sugars are still elevated from that as she is diabetic.  We'll reevaluate her progress in approximately 4 weeks.  If her shoulder is still giving her significant problems a may consider injecting her at that time.  At that office visit I  will plan also see her back for the osteoarthritis of the bilateral knees.  I have ordered Synvisc one injections and plan to get them to her at that appointment.  Patient verbalizes understanding and agrees with the above plan.    Return in about 4 weeks (around 2/14/2017).          The patient understands, chooses and consents to this plan and accepts all   the risks which include but are not limited to the risks mentioned above.     Disclaimer: This note was prepared using a voice recognition system and is likely to have sound alike errors within the text.

## 2017-01-17 NOTE — PROGRESS NOTES
Subjective:      Patient ID: Leah Mercado is a 61 y.o. female.    Chief Complaint: Pain of the Right Shoulder and Pain of the Left Shoulder      HPI: Leah Mercado  is a 61 y.o. female who c/o Pain of the Right Shoulder and Pain of the Left Shoulder   for duration of ***    Review of Systems   Constitution: Negative for fever.   HENT: Negative for headaches.    Cardiovascular: Negative for chest pain.   Respiratory: Negative for cough and shortness of breath.    Skin: Negative for rash.   Musculoskeletal: Positive for joint pain. Negative for joint swelling and stiffness.   Gastrointestinal: Negative for heartburn.   Neurological: Negative for numbness.         Objective:        General    Nursing note and vitals reviewed.  Constitutional: She is oriented to person, place, and time. She appears well-developed and well-nourished.   HENT:   Head: Normocephalic and atraumatic.   Eyes: EOM are normal.   Cardiovascular: Normal rate and regular rhythm.    Pulmonary/Chest: Effort normal.   Abdominal: Soft.   Neurological: She is alert and oriented to person, place, and time.   Psychiatric: She has a normal mood and affect. Her behavior is normal.                     Xray:   Bilateral wrist from today images and report were reviewed today.  I agree with the radiologist's interpretation.  There is mild degenerative change at each of the 1st CMC joints and in the MCP joints bilaterally.  Some intraosseous cystic change within the lunate and capitate bones.  Dystrophic calcification volar to the carpus on the right.  No acute fracture or dislocation.    Assessment:       No diagnosis found.       Plan:       There are no diagnoses linked to this encounter.    ***    No Follow-up on file.          The patient understands, chooses and consents to this plan and accepts all   the risks which include but are not limited to the risks mentioned above.     Disclaimer: This note was prepared using a voice recognition system and is  likely to have sound alike errors within the text.

## 2017-01-19 ENCOUNTER — TELEPHONE (OUTPATIENT)
Dept: INTERNAL MEDICINE | Facility: CLINIC | Age: 62
End: 2017-01-19

## 2017-01-19 NOTE — TELEPHONE ENCOUNTER
Patient returned call from 01/13.  She was informed of dr. Salazar's decision for long term disability

## 2017-01-23 ENCOUNTER — TELEPHONE (OUTPATIENT)
Dept: ORTHOPEDICS | Facility: CLINIC | Age: 62
End: 2017-01-23

## 2017-01-23 ENCOUNTER — TELEPHONE (OUTPATIENT)
Dept: NEUROLOGY | Facility: CLINIC | Age: 62
End: 2017-01-23

## 2017-01-23 NOTE — TELEPHONE ENCOUNTER
Spoke to pt informed her that her MRI was denied due to her insurance stating it will have to be done at [facility]. Pt verbalized she understood and no other concerns at this time.

## 2017-01-23 NOTE — TELEPHONE ENCOUNTER
Attempted to contact pt regarding her insurance denial within the [facility] organization.  Message left requesting pt return call at earliest convenience.

## 2017-01-23 NOTE — TELEPHONE ENCOUNTER
----- Message from Nya Green sent at 1/23/2017 10:33 AM CST -----  Contact: pt  States she's returning a nurse call. Please call pt at [phone]. Thank you

## 2017-01-27 ENCOUNTER — TELEPHONE (OUTPATIENT)
Dept: RADIOLOGY | Facility: HOSPITAL | Age: 62
End: 2017-01-27

## 2017-01-27 ENCOUNTER — TELEPHONE (OUTPATIENT)
Dept: INTERNAL MEDICINE | Facility: CLINIC | Age: 62
End: 2017-01-27

## 2017-01-27 NOTE — TELEPHONE ENCOUNTER
----- Message from Nya Green sent at 1/27/2017  9:29 AM CST -----  Contact: pt  States she's returning a nurse call. Please call pt at [phone]. Thank you

## 2017-01-27 NOTE — TELEPHONE ENCOUNTER
Returned call. Patient stated that she received a phone call stating that an appointment was canceled. I informed patient that we did not call her but informed her that it looks like she was getting a phone call to let her know that her MRI was denied and the appointment was canceled.

## 2017-01-30 ENCOUNTER — TELEPHONE (OUTPATIENT)
Dept: INTERNAL MEDICINE | Facility: CLINIC | Age: 62
End: 2017-01-30

## 2017-01-30 NOTE — TELEPHONE ENCOUNTER
----- Message from Kay Benson sent at 1/30/2017  8:30 AM CST -----  Contact: pt- [phone]  Returning nurse call.

## 2017-02-08 ENCOUNTER — OFFICE VISIT (OUTPATIENT)
Dept: OPHTHALMOLOGY | Facility: CLINIC | Age: 62
End: 2017-02-08
Payer: COMMERCIAL

## 2017-02-08 DIAGNOSIS — H40.1194 INDETERMINATE STAGE CHRONIC OPEN ANGLE GLAUCOMA: Primary | ICD-10-CM

## 2017-02-08 PROCEDURE — 76514 ECHO EXAM OF EYE THICKNESS: CPT | Mod: S$GLB,,, | Performed by: OPTOMETRIST

## 2017-02-08 PROCEDURE — 92133 CPTRZD OPH DX IMG PST SGM ON: CPT | Mod: S$GLB,,, | Performed by: OPTOMETRIST

## 2017-02-08 PROCEDURE — 99999 PR PBB SHADOW E&M-EST. PATIENT-LVL II: CPT | Mod: PBBFAC,,, | Performed by: OPTOMETRIST

## 2017-02-08 PROCEDURE — 92083 EXTENDED VISUAL FIELD XM: CPT | Mod: S$GLB,,, | Performed by: OPTOMETRIST

## 2017-02-08 PROCEDURE — 92012 INTRM OPH EXAM EST PATIENT: CPT | Mod: S$GLB,,, | Performed by: OPTOMETRIST

## 2017-02-08 NOTE — PROGRESS NOTES
HPI     See last note.  She is transferring care for her glaucoma to me from [facility].  She is using latanoprost qhs OU.  She is here today for her   first (at this clinic) 24-2 HVF, GOCT, CCT, and repeat IOP check.      Last [facility] exam 01/12/2017  HVF/GOCT  COAG  Medication: Latanoprost 0.005%  Diabetic       Last edited by KVNG Chadwick, OD on 2/8/2017  3:26 PM.         Assessment /Plan     For exam results, see Encounter Report.    Indeterminate stage chronic open angle glaucoma    She is transferring care for her glaucoma to me from [facility].  She is using latanoprost qhs OU.  She is here today for her first (at this clinic) 24-2 HVF, GOCT, CCT, and repeat IOP check.    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  This is her first VF at out office.  She is transferring care to us from [facility]because all of her doctors are at [facility] now.    GOCT:  All is normal.  There is no NFL loss in either eye.  As noted above she is transferring her care to me from [facility] So that all of her doctors are at [facility].      I will repeat both the HVF and GOCT in one year.    Her eye pressures were WNL today OU.    Continue latanoprost one drop OU qhs.  RTC 3 months for IOP check.

## 2017-02-13 ENCOUNTER — TELEPHONE (OUTPATIENT)
Dept: ORTHOPEDICS | Facility: CLINIC | Age: 62
End: 2017-02-13

## 2017-02-20 RX ORDER — AMLODIPINE BESYLATE 10 MG/1
TABLET ORAL
Qty: 30 TABLET | Refills: 6 | Status: SHIPPED | OUTPATIENT
Start: 2017-02-20

## 2017-08-07 DIAGNOSIS — E11.9 TYPE 2 DIABETES MELLITUS WITHOUT COMPLICATION: ICD-10-CM

## 2017-10-17 ENCOUNTER — PATIENT OUTREACH (OUTPATIENT)
Dept: ADMINISTRATIVE | Facility: HOSPITAL | Age: 62
End: 2017-10-17